# Patient Record
Sex: MALE | Race: AMERICAN INDIAN OR ALASKA NATIVE | URBAN - METROPOLITAN AREA
[De-identification: names, ages, dates, MRNs, and addresses within clinical notes are randomized per-mention and may not be internally consistent; named-entity substitution may affect disease eponyms.]

---

## 2017-01-11 ENCOUNTER — OFFICE VISIT (OUTPATIENT)
Dept: ORTHOPEDICS | Facility: CLINIC | Age: 22
End: 2017-01-11

## 2017-01-11 DIAGNOSIS — M51.16 LUMBAR DISC HERNIATION WITH RADICULOPATHY: Primary | ICD-10-CM

## 2017-01-11 NOTE — Clinical Note
1/11/2017      RE: Art Vargas  [id number] [facility] [id number]       1/11/17 - Training Room Note    CC: Lumbar disc herniation with radiculopathy    S: Art [place] and was treated with epidural injection upon return. He did see improvement, especially with pain and numbness with the injection and subsequent symptomatic treatment. He did see a physician in his home town over break and initiated treatment there. At this point, he is comfortable and ambulating, though has not returned to any football or conditioning activities.    O: Alert, oriented, NAD.  Back: Skin intact, no bony abnormalities. No TTP over spine. Mild TTP over bilateral perispinal musculature. Minimal pain with flexion and extension.  Neuro: 4/5 strength with dorsiflexion of ankle and great toe. 5/5 strength with plantarflexion, knee and hip motions.SILT to all distributions of the lower extremity. 2+ achilles reflex. 1+ patellar reflex. 2+ DP/PT pulses. -SLR on right.    Assessment:  1) Lumbar disc herniation with radicular symptoms    Plan  1) Continue Symptomatic treatment  2) Follow-up with home physician. 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

## 2017-01-11 NOTE — Clinical Note
Date:January 19, 2017      Patient was self referred, no letter generated. Do not send.        [facility]

## 2017-01-18 NOTE — PROGRESS NOTES
1/11/17 - Training Room Note    CC: Lumbar disc herniation with radiculopathy    S: Art [place] and was treated with epidural injection upon return. He did see improvement, especially with pain and numbness with the injection and subsequent symptomatic treatment. He did see a physician in his home town over break and initiated treatment there. At this point, he is comfortable and ambulating, though has not returned to any football or conditioning activities.    O: Alert, oriented, NAD.  Back: Skin intact, no bony abnormalities. No TTP over spine. Mild TTP over bilateral perispinal musculature. Minimal pain with flexion and extension.  Neuro: 4/5 strength with dorsiflexion of ankle and great toe. 5/5 strength with plantarflexion, knee and hip motions.SILT to all distributions of the lower extremity. 2+ achilles reflex. 1+ patellar reflex. 2+ DP/PT pulses. -SLR on right.    Assessment:  1) Lumbar disc herniation with radicular symptoms    Plan  1) Continue Symptomatic treatment  2) Follow-up with home physician. 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